# Patient Record
Sex: MALE | Race: WHITE
[De-identification: names, ages, dates, MRNs, and addresses within clinical notes are randomized per-mention and may not be internally consistent; named-entity substitution may affect disease eponyms.]

---

## 2022-10-30 ENCOUNTER — HOSPITAL ENCOUNTER (EMERGENCY)
Dept: HOSPITAL 12 - ER | Age: 27
Discharge: HOME | End: 2022-10-30
Payer: COMMERCIAL

## 2022-10-30 VITALS — BODY MASS INDEX: 34.71 KG/M2 | HEIGHT: 78 IN | WEIGHT: 300 LBS

## 2022-10-30 VITALS — SYSTOLIC BLOOD PRESSURE: 123 MMHG | DIASTOLIC BLOOD PRESSURE: 74 MMHG

## 2022-10-30 DIAGNOSIS — R94.31: ICD-10-CM

## 2022-10-30 DIAGNOSIS — R07.89: ICD-10-CM

## 2022-10-30 DIAGNOSIS — M54.50: ICD-10-CM

## 2022-10-30 DIAGNOSIS — E08.9: Primary | ICD-10-CM

## 2022-10-30 DIAGNOSIS — E66.9: ICD-10-CM

## 2022-10-30 DIAGNOSIS — G89.29: ICD-10-CM

## 2022-10-30 DIAGNOSIS — E55.9: ICD-10-CM

## 2022-10-30 LAB
ALP SERPL-CCNC: 70 U/L (ref 50–136)
ALT SERPL W/O P-5'-P-CCNC: 32 U/L (ref 16–63)
AST SERPL-CCNC: 19 U/L (ref 15–37)
BILIRUB DIRECT SERPL-MCNC: < 0.1 MG/DL (ref 0–0.2)
BILIRUB SERPL-MCNC: 0.3 MG/DL (ref 0.2–1)
BUN SERPL-MCNC: 20 MG/DL (ref 7–18)
CHLORIDE SERPL-SCNC: 103 MMOL/L (ref 98–107)
CO2 SERPL-SCNC: 26 MMOL/L (ref 21–32)
CREAT SERPL-MCNC: 1.3 MG/DL (ref 0.6–1.3)
GLUCOSE SERPL-MCNC: 108 MG/DL (ref 74–106)
HCT VFR BLD AUTO: 46.1 % (ref 36.7–47.1)
MCH RBC QN AUTO: 28.4 UUG (ref 23.8–33.4)
MCV RBC AUTO: 84.8 FL (ref 73–96.2)
PLATELET # BLD AUTO: 237 K/UL (ref 152–348)
POTASSIUM SERPL-SCNC: 3.7 MMOL/L (ref 3.5–5.1)
WS STN SPEC: 8 G/DL (ref 6.4–8.2)

## 2022-10-30 PROCEDURE — A4663 DIALYSIS BLOOD PRESSURE CUFF: HCPCS

## 2022-10-30 NOTE — NUR
Patient discharged to home in stable condition.  Written and verbal after care 
instructions given. 

Patient verbalizes understanding of instructions. Stressed follow up or return 
to ER for worsening s/s.

Pt out of ER with steady gait, no acute signs of distress, VSS, IV site 
discontinued, all belongings taken, provided with copies of lab and xray 
results. 